# Patient Record
Sex: FEMALE | Race: BLACK OR AFRICAN AMERICAN | NOT HISPANIC OR LATINO | Employment: FULL TIME | URBAN - METROPOLITAN AREA
[De-identification: names, ages, dates, MRNs, and addresses within clinical notes are randomized per-mention and may not be internally consistent; named-entity substitution may affect disease eponyms.]

---

## 2023-11-08 ENCOUNTER — HOSPITAL ENCOUNTER (EMERGENCY)
Facility: HOSPITAL | Age: 48
Discharge: HOME | End: 2023-11-08
Attending: EMERGENCY MEDICINE
Payer: COMMERCIAL

## 2023-11-08 VITALS
WEIGHT: 170 LBS | RESPIRATION RATE: 18 BRPM | HEIGHT: 58 IN | BODY MASS INDEX: 35.68 KG/M2 | TEMPERATURE: 98 F | DIASTOLIC BLOOD PRESSURE: 72 MMHG | HEART RATE: 85 BPM | OXYGEN SATURATION: 99 % | SYSTOLIC BLOOD PRESSURE: 133 MMHG

## 2023-11-08 DIAGNOSIS — S68.119A AMPUTATION OF FINGER TIP, INITIAL ENCOUNTER: Primary | ICD-10-CM

## 2023-11-08 PROCEDURE — 99284 EMERGENCY DEPT VISIT MOD MDM: CPT

## 2023-11-08 PROCEDURE — 63600175 PHARM REV CODE 636 W HCPCS

## 2023-11-08 PROCEDURE — 90715 TDAP VACCINE 7 YRS/> IM: CPT

## 2023-11-08 PROCEDURE — 25000003 PHARM REV CODE 250

## 2023-11-08 PROCEDURE — 90471 IMMUNIZATION ADMIN: CPT

## 2023-11-08 RX ORDER — MUPIROCIN 20 MG/G
OINTMENT TOPICAL 3 TIMES DAILY
Qty: 15 G | Refills: 0 | Status: SHIPPED | OUTPATIENT
Start: 2023-11-08

## 2023-11-08 RX ORDER — ACETAMINOPHEN 500 MG
500 TABLET ORAL EVERY 6 HOURS PRN
Qty: 30 TABLET | Refills: 0 | Status: SHIPPED | OUTPATIENT
Start: 2023-11-08

## 2023-11-08 RX ORDER — HYDROCODONE BITARTRATE AND ACETAMINOPHEN 5; 325 MG/1; MG/1
1 TABLET ORAL
Status: COMPLETED | OUTPATIENT
Start: 2023-11-08 | End: 2023-11-08

## 2023-11-08 RX ORDER — NAPROXEN 500 MG/1
500 TABLET ORAL 2 TIMES DAILY
Qty: 30 TABLET | Refills: 0 | Status: SHIPPED | OUTPATIENT
Start: 2023-11-08

## 2023-11-08 RX ADMIN — Medication 1 EACH: at 06:11

## 2023-11-08 RX ADMIN — TETANUS TOXOID, REDUCED DIPHTHERIA TOXOID AND ACELLULAR PERTUSSIS VACCINE, ADSORBED 0.5 ML: 5; 2.5; 8; 8; 2.5 SUSPENSION INTRAMUSCULAR at 06:11

## 2023-11-08 RX ADMIN — HYDROCODONE BITARTRATE AND ACETAMINOPHEN 1 TABLET: 5; 325 TABLET ORAL at 06:11

## 2023-11-09 NOTE — ED PROVIDER NOTES
Encounter Date: 11/8/2023       History     Chief Complaint   Patient presents with    Laceration     Pt reports slicing cucumbers at home and sliced her finger. PTA  Tip of finger is cut off, bleeding controlled in triage.  Pt is able to move her finger, has sensation and movement.      HPI    48-year-old female with no pertinent past medical history an unknown tetanus vaccination status presenting to the emergency department today complaining of pain and bleeding to the tip of her right pointer finger after cutting cucumbers with a manual slicer.  Patient states she was using a slice her to cut cucumbers when she accidentally hit her finger tip cut by the device.  Patient states bleeding is not controlled.  Patient has not taken anything for pain.  Patient states she still has sensation and movement in her finger.  Patient denies any fever, swelling, discharge, chest pain, shortness for breath, abdominal pain.      Review of patient's allergies indicates:  No Known Allergies  No past medical history on file.  No past surgical history on file.  No family history on file.     Review of Systems   Constitutional:  Negative for fever.   HENT:  Negative for sore throat.    Eyes:  Negative for redness.   Respiratory:  Negative for shortness of breath.    Cardiovascular:  Negative for chest pain.   Gastrointestinal:  Negative for abdominal pain and nausea.   Genitourinary:  Negative for dysuria.   Musculoskeletal:  Negative for arthralgias, back pain, joint swelling, myalgias, neck pain and neck stiffness.   Skin:  Positive for wound (Distal right pointer finger). Negative for pallor and rash.   Neurological:  Negative for weakness and numbness.   Hematological:  Does not bruise/bleed easily.       Physical Exam     Initial Vitals [11/08/23 1720]   BP Pulse Resp Temp SpO2   (!) 156/80 93 20 98 °F (36.7 °C) 100 %      MAP       --         Physical Exam    Nursing note and vitals reviewed.  Constitutional: She appears  well-developed and well-nourished. She is not diaphoretic. No distress.   HENT:   Head: Normocephalic and atraumatic.   Right Ear: External ear normal.   Left Ear: External ear normal.   Nose: Nose normal.   Mouth/Throat: Oropharynx is clear and moist.   Eyes: Conjunctivae and EOM are normal. Pupils are equal, round, and reactive to light. Right eye exhibits no discharge. Left eye exhibits no discharge.   Neck:   Normal range of motion.  Cardiovascular:  Normal rate, regular rhythm, normal heart sounds, intact distal pulses and normal pulses.           Pulmonary/Chest: Breath sounds normal. No respiratory distress.   Abdominal: Abdomen is soft. Bowel sounds are normal. She exhibits no distension. There is no abdominal tenderness.   Musculoskeletal:         General: Normal range of motion.      Right elbow: Normal.      Left elbow: Normal.      Right wrist: Normal.      Left wrist: Normal.      Right hand: Laceration present. No swelling, deformity, tenderness or bony tenderness. Normal range of motion. Normal strength. Normal sensation. There is no disruption of two-point discrimination. Normal capillary refill. Normal pulse.      Left hand: Normal.        Hands:       Cervical back: Normal range of motion.      Comments: Right index finger:  Clean and sharp laceration to the tip of the finger resulting in the left lateral finger tip and nail removal.  No evidence of bone.  Neurovascular exam intact.      Neurological: She is alert and oriented to person, place, and time. She has normal strength. No cranial nerve deficit or sensory deficit.   Skin: Skin is warm and dry. Capillary refill takes less than 2 seconds. Laceration noted. No abrasion, no bruising, no ecchymosis, no lesion, no rash and no abscess noted. No erythema. No pallor.   Psychiatric: She has a normal mood and affect. Her behavior is normal. Thought content normal.         ED Course   Procedures  Labs Reviewed - No data to display       Imaging  Results              X-Ray Finger 2 or More Views Right (Final result)  Result time 11/08/23 17:49:52      Final result by Ethan Nuñez MD (11/08/23 17:49:52)                   Impression:      As above.      Electronically signed by: Ethan Nuñez  Date:    11/08/2023  Time:    17:49               Narrative:    EXAMINATION:  XR FINGER 2 OR MORE VIEWS RIGHT    CLINICAL HISTORY:  distal index finger wound;    TECHNIQUE:  Three views    COMPARISON:  None    FINDINGS:  Soft tissue irregularity at the tip of the index finger distal phalanx.  No detectable radiopaque foreign body, although evaluation is limited by the overlying bandage.  No detectable acute fracture.                                       Medications   cellulose, oxidized 3 x 4' (SURGICEL) Pads 1 each (1 each Misc.(Non-Drug; Combo Route) Given 11/8/23 1815)   Tdap (BOOSTRIX) vaccine injection 0.5 mL (0.5 mLs Intramuscular Given 11/8/23 1836)   HYDROcodone-acetaminophen 5-325 mg per tablet 1 tablet (1 tablet Oral Given 11/8/23 1839)     Medical Decision Making  48-year-old female with no pertinent past medical history an unknown tetanus vaccination status presenting to the emergency department today complaining of pain and bleeding to the tip of her right pointer finger after cutting cucumbers with a manual slicer.  Patient states she was using a slice her to cut cucumbers when she accidentally hit her finger tip cut by the device.  Patient states bleeding is not controlled.  Patient has not taken anything for pain.  Patient states she still has sensation and movement in her finger.  Patient denies any fever, swelling, discharge, chest pain, shortness for breath, abdominal pain.  Patient's chart and medical history reviewed.  Patient's vitals reviewed.  They are afebrile, no respiratory distress, nontoxic-appearing in the ED.  - Fx/dislocation:  No evidence on x-ray imaging, neurovascular exam intact  - cellulitis:  No evidence of erythema,  warmth, swelling  Surgicel applied with pressure dressing to control bleeding and promote healing.  Imaging and physical exam findings discussed with the patient and plan is made to have patient discharged home with Surgicel pressure dressing to keep on for the next 24 hours and to follow up with her primary care physician in the next 3-5 days.  Patient informed after 24 hour period to re-evaluate her injury and if bleeding is still consistent to return back to the emergency department.  Patient agrees with this plan does not have any questions for me at this time.  I discussed with the patient/family the diagnosis, treatment plan, indications for return to the emergency department, and for expected follow-up. The patient/family verbalized an understanding. The patient/family is asked if there are any questions or concerns. We discuss the case, until all issues are addressed to the patient/family's satisfaction. Patient/family understands and is agreeable to the plan.   HIRO CALI    DISCLAIMER: This note was prepared with [employer] voice recognition transcription software. Garbled syntax, mangled pronouns, and other bizarre constructions may be attributed to that software system.      Risk  OTC drugs.  Prescription drug management.                               Clinical Impression:   Final diagnoses:  [S68.119A] Amputation of finger tip, initial encounter (Primary)        ED Disposition Condition    Discharge Stable          ED Prescriptions       Medication Sig Dispense Start Date End Date Auth. Provider    mupirocin (BACTROBAN) 2 % ointment Apply topically 3 (three) times daily. 15 g 11/8/2023 -- Hiro Cali PA-C    naproxen (NAPROSYN) 500 MG tablet Take 1 tablet (500 mg total) by mouth 2 (two) times daily. 30 tablet 11/8/2023 -- Hiro Cali PA-C    acetaminophen (TYLENOL) 500 MG tablet Take 1 tablet (500 mg total) by mouth every 6 (six) hours as needed for Pain. 30 tablet 11/8/2023 -- Hiro Cali  SHANDA          Follow-up Information    None          Hiro Velásquez PA-C  11/08/23 1939

## 2023-11-09 NOTE — DISCHARGE INSTRUCTIONS
